# Patient Record
Sex: FEMALE | Race: WHITE | Employment: UNEMPLOYED | URBAN - METROPOLITAN AREA
[De-identification: names, ages, dates, MRNs, and addresses within clinical notes are randomized per-mention and may not be internally consistent; named-entity substitution may affect disease eponyms.]

---

## 2018-07-16 LAB
ANTIBODY SCREEN, EXTERNAL: NEGATIVE
ANTIBODY SCREEN, EXTERNAL: NEGATIVE
CHLAMYDIA, EXTERNAL: NEGATIVE
CHLAMYDIA, EXTERNAL: NEGATIVE
HBSAG, EXTERNAL: NEGATIVE
HBSAG, EXTERNAL: NEGATIVE
HIV, EXTERNAL: NEGATIVE
HIV, EXTERNAL: NEGATIVE
N. GONORRHEA, EXTERNAL: NEGATIVE
N. GONORRHEA, EXTERNAL: NEGATIVE
RPR, EXTERNAL: NEGATIVE
RPR, EXTERNAL: NEGATIVE
RUBELLA, EXTERNAL: NORMAL
RUBELLA, EXTERNAL: NORMAL
TYPE, ABO & RH, EXTERNAL: NORMAL
TYPE, ABO & RH, EXTERNAL: NORMAL

## 2018-08-15 LAB
ANTIBODY SCREEN, EXTERNAL: NEGATIVE
CHLAMYDIA, EXTERNAL: NEGATIVE
HBSAG, EXTERNAL: NEGATIVE
HIV, EXTERNAL: NEGATIVE
N. GONORRHEA, EXTERNAL: NEGATIVE
RPR, EXTERNAL: NEGATIVE
RUBELLA, EXTERNAL: NORMAL
TYPE, ABO & RH, EXTERNAL: NORMAL

## 2018-08-23 LAB — HEPATITIS C AB,   EXT: NEGATIVE

## 2019-01-18 LAB
GRBS, EXTERNAL: NEGATIVE
GRBS, EXTERNAL: NEGATIVE

## 2019-01-20 LAB — GRBS, EXTERNAL: NEGATIVE

## 2019-02-14 ENCOUNTER — HOSPITAL ENCOUNTER (INPATIENT)
Age: 29
LOS: 2 days | Discharge: HOME | End: 2019-02-16
Attending: OBSTETRICS & GYNECOLOGY | Admitting: SPECIALIST
Payer: OTHER GOVERNMENT

## 2019-02-14 LAB
BASOPHILS # BLD: 0 K/UL (ref 0–0.1)
BASOPHILS NFR BLD: 0 % (ref 0–2)
DIFFERENTIAL METHOD BLD: ABNORMAL
EOSINOPHIL # BLD: 0 K/UL (ref 0–0.4)
EOSINOPHIL NFR BLD: 0 % (ref 0–5)
ERYTHROCYTE [DISTWIDTH] IN BLOOD BY AUTOMATED COUNT: 13.5 % (ref 11.6–14.5)
HCT VFR BLD AUTO: 32.4 % (ref 35–45)
HGB BLD-MCNC: 10.4 G/DL (ref 12–16)
LYMPHOCYTES # BLD: 2.1 K/UL (ref 0.9–3.6)
LYMPHOCYTES NFR BLD: 25 % (ref 21–52)
MCH RBC QN AUTO: 27.9 PG (ref 24–34)
MCHC RBC AUTO-ENTMCNC: 32.1 G/DL (ref 31–37)
MCV RBC AUTO: 86.9 FL (ref 74–97)
MONOCYTES # BLD: 0.8 K/UL (ref 0.05–1.2)
MONOCYTES NFR BLD: 9 % (ref 3–10)
NEUTS SEG # BLD: 5.5 K/UL (ref 1.8–8)
NEUTS SEG NFR BLD: 66 % (ref 40–73)
PLATELET # BLD AUTO: 207 K/UL (ref 135–420)
PMV BLD AUTO: 9.6 FL (ref 9.2–11.8)
RBC # BLD AUTO: 3.73 M/UL (ref 4.2–5.3)
WBC # BLD AUTO: 8.4 K/UL (ref 4.6–13.2)

## 2019-02-14 PROCEDURE — 85025 COMPLETE CBC W/AUTO DIFF WBC: CPT

## 2019-02-14 PROCEDURE — 4A0HXCZ MEASUREMENT OF PRODUCTS OF CONCEPTION, CARDIAC RATE, EXTERNAL APPROACH: ICD-10-PCS | Performed by: SPECIALIST

## 2019-02-14 PROCEDURE — 86900 BLOOD TYPING SEROLOGIC ABO: CPT

## 2019-02-14 PROCEDURE — 65270000029 HC RM PRIVATE

## 2019-02-14 PROCEDURE — 75410000002 HC LABOR FEE PER 1 HR

## 2019-02-14 RX ORDER — MISOPROSTOL 200 UG/1
800 TABLET ORAL
Status: DISCONTINUED | OUTPATIENT
Start: 2019-02-14 | End: 2019-02-15

## 2019-02-14 RX ORDER — OXYTOCIN 10 [USP'U]/ML
INJECTION, SOLUTION INTRAMUSCULAR; INTRAVENOUS
Status: DISCONTINUED
Start: 2019-02-14 | End: 2019-02-14

## 2019-02-14 RX ORDER — SODIUM CHLORIDE 0.9 % (FLUSH) 0.9 %
5-10 SYRINGE (ML) INJECTION AS NEEDED
Status: DISCONTINUED | OUTPATIENT
Start: 2019-02-14 | End: 2019-02-15

## 2019-02-14 RX ORDER — SALICYLIC ACID
90 POWDER (GRAM) MISCELLANEOUS ONCE
Status: DISCONTINUED | OUTPATIENT
Start: 2019-02-14 | End: 2019-02-15

## 2019-02-14 RX ORDER — SODIUM CHLORIDE 0.9 % (FLUSH) 0.9 %
5-10 SYRINGE (ML) INJECTION EVERY 8 HOURS
Status: DISCONTINUED | OUTPATIENT
Start: 2019-02-14 | End: 2019-02-15

## 2019-02-14 RX ORDER — OXYTOCIN 10 [USP'U]/ML
10 INJECTION, SOLUTION INTRAMUSCULAR; INTRAVENOUS
Status: DISCONTINUED | OUTPATIENT
Start: 2019-02-14 | End: 2019-02-15

## 2019-02-14 RX ORDER — LIDOCAINE HYDROCHLORIDE 10 MG/ML
20 INJECTION, SOLUTION EPIDURAL; INFILTRATION; INTRACAUDAL; PERINEURAL AS NEEDED
Status: DISCONTINUED | OUTPATIENT
Start: 2019-02-14 | End: 2019-02-15

## 2019-02-15 LAB
ABO + RH BLD: NORMAL
BLOOD GROUP ANTIBODIES SERPL: NORMAL
SPECIMEN EXP DATE BLD: NORMAL

## 2019-02-15 PROCEDURE — 65270000029 HC RM PRIVATE

## 2019-02-15 PROCEDURE — 75410000000 HC DELIVERY VAGINAL/SINGLE

## 2019-02-15 PROCEDURE — 75410000002 HC LABOR FEE PER 1 HR

## 2019-02-15 PROCEDURE — 75410000003 HC RECOV DEL/VAG/CSECN EA 0.5 HR

## 2019-02-15 RX ORDER — IBUPROFEN 400 MG/1
800 TABLET ORAL
Status: DISCONTINUED | OUTPATIENT
Start: 2019-02-15 | End: 2019-02-16 | Stop reason: HOSPADM

## 2019-02-15 RX ORDER — OXYTOCIN 10 [USP'U]/ML
10 INJECTION, SOLUTION INTRAMUSCULAR; INTRAVENOUS
Status: ACTIVE | OUTPATIENT
Start: 2019-02-15 | End: 2019-02-16

## 2019-02-15 RX ORDER — MISOPROSTOL 200 UG/1
800 TABLET ORAL
Status: ACTIVE | OUTPATIENT
Start: 2019-02-15 | End: 2019-02-16

## 2019-02-15 RX ORDER — HYDROCORTISONE 10 MG/G
CREAM TOPICAL AS NEEDED
Status: DISCONTINUED | OUTPATIENT
Start: 2019-02-15 | End: 2019-02-16 | Stop reason: HOSPADM

## 2019-02-15 RX ORDER — SENNOSIDES 8.6 MG/1
2 TABLET ORAL
Status: DISCONTINUED | OUTPATIENT
Start: 2019-02-15 | End: 2019-02-16 | Stop reason: HOSPADM

## 2019-02-15 RX ORDER — ACETAMINOPHEN 325 MG/1
325-650 TABLET ORAL
Status: DISCONTINUED | OUTPATIENT
Start: 2019-02-15 | End: 2019-02-16 | Stop reason: HOSPADM

## 2019-02-15 NOTE — PROGRESS NOTES
Labor Progress Note Patient seen, fetal heart rate and contraction pattern evaluated, patient examined. Lab Results Component Value Date/Time WBC 8.4 02/14/2019 10:14 PM  
 HGB 10.4 (L) 02/14/2019 10:14 PM  
 HCT 32.4 (L) 02/14/2019 10:14 PM  
 PLATELET 354 99/34/7323 10:14 PM  
 MCV 86.9 02/14/2019 10:14 PM  
 
 
Physical Exam: 
Cervical Exam:  5 cm dilated Membranes:  Spontaneous Rupture of Membranes; Amniotic Fluid: clear fluid Uterine Activity: Frequency: Every 8 minutes Fetal Heart Rate: Baseline: 140 per minute Assessment/Plan: 
Contractions have spaced out. Summer is on bed, attempting to rest between contractions.

## 2019-02-15 NOTE — PROGRESS NOTES
Pt , 40w3d, presents to [facility] ambulatory w/  present and supportive. Pt states her water broke around 9pm and contractions started around the same time and have been occurring about every 5minutes. Pt up to change in bathroom. Admission process begun. VANDANA Corado aware of patient. -- Monitors removed. CNM at bedside for SVE 
 
0204-- Pt states feeling more pressure during contractions; denies urge to push. 0206-- CNM at bedside, pt requests CNM stay in room 12-- ; called nursery RN to attend delivery 0241-- EGNE Hernandez Nursery RN at bedside 4784--  of viable male infant 
 
56-- Assisted pt to ambulate to bed from chair 0255-- No repair needed per CNM, fundus firm, lochia sm-mod w/ clot expressed. Eve care done, pads and sheet changed 9927-- up to bathroom, able to void; eve care done, pads and linens changed 
 
0706-- TRANSFER - OUT REPORT: 
 
Verbal report given to BOB Arellano RN(name) on Clifford Toribio  being transferred to [facility](unit) for routine progression of care Report consisted of patients Situation, Background, Assessment and  
Recommendations(SBAR). Information from the following report(s) SBAR, Kardex, Intake/Output, MAR and Recent Results was reviewed with the receiving nurse.

## 2019-02-15 NOTE — PROGRESS NOTES
Pt requesting to be seen to discuss early discharge due to childcare issues.  this morning at 0252. Pediatrician had previously advised Cris and her  of  testing at 39 hours and to have post-discharge peds appt scheduled for . Cris states she was discharged following birth of first 2 children, delivered in free-standing birth centers, at 4-6 hrs pp, and staying 24hrs is \"just a protocol\". Explained we routinely d/c pts at 24-36 hours, and pediatrician d/c  when appropriate. As we are coming up to a weekend and federal holiday, and family uses [facility], it was recommended to make these arrangements today. Family has not yet contacted /[facility] to enroll . Suggested  bring older siblings to see mom and baby tonight, return home for bedtime and come back in the morning. Cris is asking to speak with pediatrician again. Dr. Donaldson Avers informed of these circumstances and request to see her.

## 2019-02-15 NOTE — L&D DELIVERY NOTE
Delivery Summary    Summer was sitting in chair, when she involuntarily pushed and baby was .  of VMI . Reduced cord around neck X 1 loose. Baby to maternal chest. Assisted Summer to bed, baby skin to skin . Cord clamped and cut after placental  Transfusion complete. Placenta delivered spont intact with 3 CB.  cc. Perineum inspected, intact. Dr Jayy Hernandez in house. [facility]    Patient: Clifton Cuevas MRN: [id number]  SSN: [id number]    YOB: 1990  Age: 34 y.o. Sex: female       Information for the patient's :  Jarred Nick [[id number]]       Labor Events:    Labor:     Rupture Date: 2019   Rupture Time: 9:00 PM   Rupture Type SROM   Amniotic Fluid Volume:      Amniotic Fluid Description: Clear None   Induction:         Augmentation:     Labor Events:       Cervical Ripening:           Delivery Events:  Episiotomy:     Laceration(s):       Repaired:      Number of Repair Packets:     Suture Type and Size:       Estimated Blood Loss (ml):  ml       Delivery Date: 2/15/2019    Delivery Time: 2:42 AM  Delivery Type:    Sex:  Male     Gestational Age: 36w2d   Delivery Clinician:     Living Status:     Delivery Location:              [facility]  One minute Five minutes Ten minutes   Skin color:            Heart rate:            Grimace:            Muscle tone:            Breathing: Totals:                Presentation:      Position:        Resuscitation Method:  Suctioning-bulb; Tactile Stimulation     Meconium Stained: None      Cord Information:    Complications:    Cord around:    Delayed cord clamping? Cord clamped date/time:   Disposition of Cord Blood:      Blood Gases Sent?:      Placenta:  Date/Time:    Removal:        Appearance:       [place] Measurements:  Birth Weight:        Birth Length:        Head Circumference:        Chest Circumference:       Abdominal Girth:       Other Providers:    , Obstetrician;Primary Nurse;Primary [place] Nurse;Nicu Nurse;Neonatologist;Anesthesiologist;Crna;Nurse Practitioner;Midwife;Nursery Nurse           Group B Strep:   Lab Results   Component Value Date/Time    GrBStrep, External Negative 2019     Information for the patient's :  BARBARA Randall Summer [[id number]]   No results found for: ABORH, PCTABR, PCTDIG, BILI, ABORHEXT, ABORH    No results for input(s): PCO2CB, PO2CB, HCO3I, SO2I, IBD, PTEMPI, SPECTI, PHICB, ISITE, IDEV, IALLEN in the last 72 hours.

## 2019-02-15 NOTE — ROUTINE PROCESS
Bedside and Verbal shift change report given to Sheri Luz RNC (oncoming nurse) by MYRA Iraheta RN (offgoing nurse). Report included the following information SBAR, Kardex, OR Summary, Procedure Summary, Intake/Output, MAR, Recent Results and Med Rec Status. Assumed care of pt. Introduced myself and updated whiteboard, explained nursing plan of care for my shift. Pt alert and oriented; assessment and vitals completed, WNL. Pt denies headache, visual disturbances and epigastric pain. t.  Pt verbalized agreement to plan. Questions answered.

## 2019-02-15 NOTE — H&P
Obstetric Admission History and Physical 
 
Name: Otelia Leyden MRN: [id number] SSN: [id number] YOB: 1990  Age: 34 y.o. Sex: female Subjective: Chief complaint:  Contractions/ SROM Cris is a 34 y.o.  female, G 3 P2 who presents at 40.3 weeks gestation with c/o contractions and SROM of clear fluid. . On admission EFM strip is obtained and is Category 1.  
 
OB HISTORY Prenatal care started at 15 wks with [facility]. TVS supporting dates and viability. Problems of pregnancy include none. Total wt gain 25 lbs. GBS neg. PAST PREGNANCY HISTORY 
 40 wks F  F 6 lbs 1 oz 
 40 wks  F  6 lbs 3 oz PAST MEDICAL / SURGICAL HISTORY No past medical history on file. Problem List as of 2019 Date Reviewed: 2019 Codes Class Noted - Resolved * (Principal) Labor and delivery indication for care or intervention ICD-10-CM: O75.9 ICD-9-CM: 659.90  2019 - Present FAMILY/ SOCIAL HISTORY Social History Socioeconomic History  Marital status:  Spouse name: Not on file  Number of children: Not on file  Years of education: Not on file  Highest education level: Not on file Social Needs  Financial resource strain: Not on file  Food insecurity - worry: Not on file  Food insecurity - inability: Not on file  Transportation needs - medical: Not on file  Transportation needs - non-medical: Not on file Occupational History  Not on file Tobacco Use  Smoking status: Not on file Substance and Sexual Activity  Alcohol use: Not on file  Drug use: Not on file  Sexual activity: Not on file Other Topics Concern  Not on file Social History Narrative  Not on file ALLERGY: 
Allergies not on file Review of Systems: A comprehensive review of systems was negative Objective: VITAL SIGNS: 
Visit Vitals /58 (BP 1 Location: Right arm) Pulse 72 Temp 97.9 °F (36.6 °C) Resp 17 Physical Exam: Abdomen: soft Position of baby vtx Estimated fetal weight 6 lbs Contractions q 5  Mins/ mod quality FHR baseline at  140 bpm/ variability mod/Category 1/accels External Genitalia: normal general appearance without lesions Cervix: Dilation: 4cm Membranes  SROM clear Assessment:  
 
1) 40.3 weeks Intrauterine Pregnancy . 2) labor management 3) low risk Plan:  
 
Reassuring fetal status, Continue plan for vaginal delivery Dr Rajani Parr MD  notified and aware of admission Signed By:  Maykel Garcia CNM February 14, 2019

## 2019-02-15 NOTE — LACTATION NOTE
Mom states baby is \"learning to not bite mom, working on latching correctly\". Experienced mom, states other 2 children did the same thing. Suggested letting baby suck on mom's finger to help learn correct suck. No questions at this time. Info sheet, daily log and resource list given. Encouraged to call with questions.

## 2019-02-16 VITALS
BODY MASS INDEX: 20.89 KG/M2 | TEMPERATURE: 97.6 F | HEIGHT: 66 IN | OXYGEN SATURATION: 100 % | RESPIRATION RATE: 18 BRPM | HEART RATE: 70 BPM | WEIGHT: 130 LBS | SYSTOLIC BLOOD PRESSURE: 112 MMHG | DIASTOLIC BLOOD PRESSURE: 61 MMHG

## 2019-02-16 LAB
HCT VFR BLD AUTO: 31.6 % (ref 35–45)
HGB BLD-MCNC: 9.9 G/DL (ref 12–16)

## 2019-02-16 PROCEDURE — 85018 HEMOGLOBIN: CPT

## 2019-02-16 PROCEDURE — 36415 COLL VENOUS BLD VENIPUNCTURE: CPT

## 2019-02-16 RX ORDER — DOCUSATE SODIUM 100 MG/1
100 CAPSULE, LIQUID FILLED ORAL DAILY
Qty: 60 CAP | Refills: 0 | Status: SHIPPED | OUTPATIENT
Start: 2019-02-16

## 2019-02-16 RX ORDER — IRON POLYSACCHARIDE COMPLEX 150 MG
150 CAPSULE ORAL DAILY
Qty: 60 CAP | Refills: 2 | Status: SHIPPED | OUTPATIENT
Start: 2019-02-16

## 2019-02-16 NOTE — DISCHARGE INSTRUCTIONS

## 2019-02-16 NOTE — DISCHARGE SUMMARY
Postpartum Day 2 Progress and Discharge Note    Patient doing well post-partum without significant complaint. Pain well controlled, not taking medication. Voiding without difficulty, normal lochia, passing flatus. Breastfeeding well. Baby stable. Vitals:    Patient Vitals for the past 8 hrs:   BP Temp Pulse Resp   19 0400 117/81 97.7 °F (36.5 °C) 62 18     Temp (24hrs), Av °F (36.7 °C), Min:97.7 °F (36.5 °C), Max:98.3 °F (36.8 °C)      Vital signs stable, afebrile. Exam:  Patient without distress. Breasts intact and non tender. Abdomen soft, fundus firm at level of umbilicus, non tender. Perineum with normal lochia noted. Lower extremities are negative for swelling or tenderness. Lab/Data Review:  CBC:   Lab Results   Component Value Date/Time    HGB 9.9 (L) 2019 06:05 AM    HCT 31.6 (L) 2019 06:05 AM       Assessment and Plan: Patient appears to be having uncomplicated post-partum course and condition is stable for discharge home. Continue routine perineal care and maternal education. Plan discharge for today with follow up in our office in 6 weeks.     Shasta Salinas CNM  2019

## 2019-02-16 NOTE — PROGRESS NOTES
TRANSFER - IN REPORT: 
 
Verbal report received from [facility] ARajani RN(name) on [facility] Report consisted of patients Situation, Background, Assessment and  
Recommendations(SBAR). Information from the following report(s) SBAR, Kardex, Intake/Output and MAR was reviewed with the receiving nurse. Opportunity for questions and clarification was provided. Assessment completed upon patients arrival to unit and care assumed.

## 2019-02-16 NOTE — PROGRESS NOTES
Mother doing well. Up without complaints. Voiding without problems. Complains of pain in nipples and cramping when feeding, declines medications, encouraged to continue voiding every couple of hours to ease cramping. Lactaction support provided by RN, assisted with proper latching . Patient states some relief with new latch positioning. Pt has lanolin at bedside. Bonding well with baby.